# Patient Record
(demographics unavailable — no encounter records)

---

## 2024-10-09 NOTE — PRE-SURGICAL ASSESSMENT
[PST Chart Review] : PST Chart Review (low patient risk, very low procedural risk) [2 weeks] : 2 weeks [none] : none [0 - 2: Low Risk] : 0 - 2: Low Risk [Willing to accept blood - if not, specify reason >>] : Willing to accept blood [No] : No, patient is not pregnant [S: Do you SNORE loudly] : does not snore loudly [T: Are you TIRED, fatigued, or sleepy during the daytime] : not tired, fatigued, or sleepy during the daytime [O: Has anyone OBSERVED you stop breathing during your sleep] : not observed to have stopped during sleep [P: Do you have, or are you being treated for, high blood PRESSURE] : no hypertension, not treated for high blood pressure [B: BMI greater than 35 kG/m2] : BMI less than 35 kG/m2 [A: AGE over 50 years old] : not over 50 years of age [N: NECK circumference greater than 16 inches] : neck circumference less than 16 inches [G: GENDER (birth sex) = Male] : not male (birth sex) [Devices being used in treatment (i.e., pacemaker, defib, loop recorder, stimulator, pain pump, other devices/implantables)] : No devices in use in treatment [Advance Directive in Chart] : No advance directive in chart [Health Care Proxy information in chart] : No Health Care Proxy information in chart

## 2024-10-09 NOTE — HISTORY OF PRESENT ILLNESS
[de-identified] : This patient presents today for follow-up regarding left knee internal derangement we did send her for an MRI to rule out ACL tear and meniscal tearing as she has severe loss of motion and pain.  She presents today for follow-up and review of the MRI results.  She is ambulating in the brace.  She notes improvement in her range of motion and pain level.  She takes Tylenol for the pain.

## 2024-10-09 NOTE — PHYSICAL EXAM
[de-identified] : The patient appears well nourished  and in no apparent distress.  The patient is alert and oriented to person, place, and time.   Affect and mood appear normal.    The head is normocephalic and atraumatic.  The eyes reveal normal sclera and extra ocular muscles are intact.   The neck appears normal with no jugular venous distention or masses noted.   Skin shows normal turgor with no evidence of eczema or psoriasis.  No respiratory distress noted.  The patient ambulates with an antalgic gait.  The left knee has loss of motion.  She lacks a few degrees of extension she flexes to approximately 95 degrees. She has significant tenderness over the medial and lateral joint.  There is a small effusion.  Positive Matt sign.  There is no soft tissue swelling, warmth, or erythema.  Positive Lachman sign.  Negative pivot shift test.  There is no instability to varus/valgus stress.    There is normal strength  in the quadriceps and hamstring muscles.  Strength and sensation are intact distally.  There are normal pulses distally and good capillary refill.  No edema or lymphadenopathy noted.    [de-identified] : MRIs reviewed.  There is evidence of full-thickness tear of the ACL.  There is also evidence of peripheral tearing of the medial meniscus and tearing of the anterior horn the lateral meniscus.  Bone bruises are also noted in the tibial plateau.

## 2024-10-09 NOTE — DISCUSSION/SUMMARY
[de-identified] : This patient presents today for reevaluation regarding left knee internal derangement.  The MRI confirms ACL tear and medial lateral meniscal tears.  I discussed the MRI results with the patient and her mother at length.  I recommended arthroscopic ACL reconstruction.  I discussed the procedure to the patient and her mother at length.  They had the opportunity ask any questions which were answered to the satisfaction per we discussed risk benefits alternative treatment plans.  They agreed to proceed with the surgery we will schedule sometime in the near future.  At least 30 minutes was spent performing the evaluation and management on today's office visit.  This includes but is not limited to preparing to see patient including review of any test results or outside medical records, obtaining and/or reviewing separately obtained history, performing examination and evaluation, counseling and educating the patient on their diagnosis and treatment recommendations, ordering medications, tests, or procedures, documenting clinical information in the electronic health record, independently interpreting results (not separately reported) and communicating results to the patient, and coordination of care.

## 2024-10-28 NOTE — PHYSICAL EXAM
[de-identified] : The patient appears well nourished  and in no apparent distress.  The patient is alert and oriented to person, place, and time.   Affect and mood appear normal.    The head is normocephalic and atraumatic.  The eyes reveal normal sclera and extra ocular muscles are intact.   The neck appears normal with no jugular venous distention or masses noted.   Skin shows normal turgor with no evidence of eczema or psoriasis.  No respiratory distress noted.  The patient ambulates with an antalgic gait.  The left knee has decreased range of motion 0 to 120 degrees. She has significant tenderness over the medial and lateral joint.  There is a small effusion.  Positive Matt sign.  There is no soft tissue swelling, warmth, or erythema.  Positive Lachman sign.  Negative pivot shift test.  There is no instability to varus/valgus stress.    There is normal strength  in the quadriceps and hamstring muscles.  Strength and sensation are intact distally.  There are normal pulses distally and good capillary refill.  No edema or lymphadenopathy noted.

## 2024-10-28 NOTE — REASON FOR VISIT
[Follow-Up Visit] : a follow-up visit for [Parent] : parent [FreeTextEntry2] : left lateral meniscus tear.
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
## 2024-10-28 NOTE — DISCUSSION/SUMMARY
[de-identified] : The patient presents today for follow-up status post ACL rupture of the left knee.  Patient's physical exam reveals evidence of ACL tear.  Minimal swelling noted on today's visit.  I discussed the surgical procedure again for the patient and her mother.  We went over graft selection and the procedure at length.  We went over postoperative rehabilitation protocols.  Patient is scheduled to have surgery on November 15 and we will see her in the operative at that time for the procedure.  Patient and the mother had the opportunity ask any questions which were answered to their satisfaction.  At least 20 minutes was spent performing the evaluation and management on today's office visit.  This includes but is not limited to preparing to see patient including review of any test results or outside medical records, obtaining and/or reviewing separately obtained history, performing examination and evaluation, counseling and educating the patient on their diagnosis and treatment recommendations, ordering medications, tests, or procedures, documenting clinical information in the electronic health record, independently interpreting results (not separately reported) and communicating results to the patient, and coordination of care.

## 2024-11-06 NOTE — PLAN
[FreeTextEntry1] : 18 y/o female with left knee pain plan left knee arthroscopy Dr Baer  for immunization copy cbc, [facility] on admit Mom with patient. Instructions provided

## 2024-11-06 NOTE — HISTORY OF PRESENT ILLNESS
[de-identified] : 16 y/o female with left knee pain and had an MRI with ACL tear and meniscus tear scheduled for left knee arthroscopy. Wears brace for ambulation [de-identified] : Seen by DR. Dunlap and was advised surgery

## 2024-11-06 NOTE — REVIEW OF SYSTEMS
[Fever] : no fever [Chills] : no chills [Feeling Poorly] : not feeling poorly [Feeling Tired] : not feeling tired [Recent Weight Gain (___ Lbs)] : no recent weight gain [Recent Weight Loss (___ Lbs)] : no recent weight loss [Eye Pain] : no eye pain [Red Eyes] : eyes not red [Eyesight Problems] : no eyesight problems [Discharge From Eyes] : no purulent discharge from the eyes [Dry Eyes] : no dryness of the eyes [Eyes Itch] : no itching of the eyes [Earache] : no earache [Loss Of Hearing] : no hearing loss [Nosebleeds] : no nosebleeds [Nasal Discharge] : no nasal discharge [Hoarseness] : no hoarseness [Heart Rate Is Slow] : the heart rate was not slow [Heart Rate Is Fast] : the heart rate was not fast [Chest Pain] : no chest pain [Palpitations] : no palpitations [Leg Claudication] : no intermittent leg claudication [Joint Swelling] : joint swelling [Negative] : Heme/Lymph [FreeTextEntry9] : left knee pain, brace for ambulation

## 2024-11-06 NOTE — PRE-SURGICAL ASSESSMENT
[Telehealth PST] : Telehealth PST (intermediate patient risk, low-intermediate procedural risk) [S: Do you SNORE loudly] : does not snore loudly [T: Are you TIRED, fatigued, or sleepy during the daytime] : not tired, fatigued, or sleepy during the daytime [O: Has anyone OBSERVED you stop breathing during your sleep] : not observed to have stopped during sleep [P: Do you have, or are you being treated for, high blood PRESSURE] : no hypertension, not treated for high blood pressure [B: BMI greater than 35 kG/m2] : BMI less than 35 kG/m2 [A: AGE over 50 years old] : not over 50 years of age [N: NECK circumference greater than 16 inches] : neck circumference less than 16 inches [G: GENDER (birth sex) = Male] : not male (birth sex) [Advance Directive in Chart] : No advance directive in chart [Health Care Proxy information in chart] : No Health Care Proxy information in chart

## 2024-11-25 NOTE — HISTORY OF PRESENT ILLNESS
[___ Weeks Post Op] : [unfilled] weeks post op [5] : the patient reports pain that is 5/10 in severity [Nausea] : nausea [Chills] : no chills [Fever] : no fever [Vomiting] : no vomiting [de-identified] :  Left knee arthroscopy DOS: 11/15/24 [de-identified] : This patient presents for the first postop visit status post left knee ACL reconstruction.  Patient notes moderate discomfort.  She is ambulating with the brace locked in extension.  Denies fever or chills.  Denies any drainage from the incision. [de-identified] : Exam of the left knee reveals incisions were healing well.  The portal incisions are healing well.  Slight soft tissue swelling and effusion.  Slight ecchymosis.  No drainage.  Range of motion from 0 to approximately 40 degrees. [de-identified] : AP lateral merchant views of the left knee were obtained.  Patient status post ACL reconstruction.  Bone blocks and interference screws are in good position. [de-identified] : Sutures were removed from the portals on today's visit and they were Steri-Stripped.  The patient was given instructions on postoperative wound care. The patient may bathe and shower.  They should pat the portals dry with a towel after bathing.  The patient should avoid submerging the knee in a bathtub, pool, and any salt water for the next 2 weeks.    The patient was also given instructions on activity level.  The patient may now ambulate with the brace unlocked.  The brace must always be worn when ambulating.  The patient should wean off the crutches over the next 1-2 week.   The patient will start physical therapy and was given a prescription for therapy.  The patient will call the office if there are any problems such as  swelling, redness, warmth of the knee or any drainage from the portals. The patient was instructed to make a followup appointment in 4 weeks.

## 2024-12-30 NOTE — HISTORY OF PRESENT ILLNESS
[___ Weeks Post Op] : [unfilled] weeks post op [de-identified] : left knee ACL reconstruction 11/15/2024 [de-identified] : This patient presents today for follow-up status post left knee ACL reconstruction approximate 6 weeks ago.  She has no pain.  Go to therapy 3 times a week.  Denies any swelling clicking locking or buckling.  She has been wearing the brace.  She notes improvement in her range of motion and her pain level. [de-identified] : Exam of the left knee reveals range of motion 0 to 120 degrees.  The incisions well-healed.  No warmth erythema.  No ecchymosis.  No drainage noted.  Negative Lachman sign.  No instability to varus or valgus stress.  Pulses are intact distally.  Capillary refill is normal. There is no edema or lymphadenopathy. [de-identified] : AP, lateral, and merchant views of the left knee were obtained.  Bone blocks are in good position.  The joint spaces are well maintained without evidence of degenerative arthritis. The alignment of the knee is normal.  No fractures or dislocations are noted. [de-identified] : The patient presents today for follow-up regarding left knee ACL reconstruction performed approximately 6 weeks ago.  Patient doing very well with therapy.  She will continue therapy and see us back in 8 weeks for follow-up.

## 2025-02-05 NOTE — HISTORY OF PRESENT ILLNESS
[de-identified] : S/P left knee ACL reconstruction DOS. 11/15/2024 [de-identified] : This patient presents today for follow-up regarding left knee ACL reconstruction.  Patient is ambulating without assistive devices or bracing.  Patient denies any pain swelling clicking locking or buckling.  Overall she doing very well.  She is discharged for therapy has been doing home exercises. [de-identified] : Exam of the left knee reveals the incision is well-healed but there is keloid formation.  Her range of motion 0 to 135 degrees without pain.  Slight clicking noted with flexion extension of the patellofemoral joint.  Negative Lachman sign.  No instability to varus valgus stress.  Slight weakness of the quadricep noted.  Pulses are intact distally.  Capillary refill is normal. There is no edema or lymphadenopathy. [de-identified] : X-ray of the left knee.  AP lateral and merchant views reveals the patient status post ACL reconstruction.  Bone blocks and interfere screws in good position. [de-identified] : Patient presents today for follow-up status post left knee ACL reconstruction.  Patient doing very well with home exercises and strengthening.  She is here to continue home exercises and see us back for 1 additional visit in 6 to 8 weeks.